# Patient Record
Sex: FEMALE | Race: WHITE
[De-identification: names, ages, dates, MRNs, and addresses within clinical notes are randomized per-mention and may not be internally consistent; named-entity substitution may affect disease eponyms.]

---

## 2017-01-04 ENCOUNTER — HOSPITAL ENCOUNTER (OUTPATIENT)
Dept: HOSPITAL 45 - C.LABSPEC | Age: 57
Discharge: HOME | End: 2017-01-04
Attending: DERMATOLOGY
Payer: COMMERCIAL

## 2017-01-04 DIAGNOSIS — R21: Primary | ICD-10-CM

## 2017-01-17 ENCOUNTER — HOSPITAL ENCOUNTER (OUTPATIENT)
Dept: HOSPITAL 45 - C.MAMM | Age: 57
Discharge: HOME | End: 2017-01-17
Payer: COMMERCIAL

## 2017-01-17 DIAGNOSIS — Z12.31: Primary | ICD-10-CM

## 2017-01-18 NOTE — MAMMOGRAPHY REPORT
BILATERAL DIGITAL SCREENING MAMMOGRAM TOMOSYNTHESIS WITH CAD: 1/17/2017

CLINICAL HISTORY: Routine screening.  Patient has no complaints.  





TECHNIQUE:  Breast tomosynthesis in addition to standard 2D mammography was performed. Current study
 was also evaluated with a Computer Aided Detection (CAD) system.  



COMPARISON: Comparison is made to exams dated:  8/12/2015 mammogram, 3/7/2014 mammogram, 1/31/2013 m
ammogram, 1/10/2011 mammogram, 12/30/2009 mammogram - [facility], and 12/1/2008.  
 



BREAST COMPOSITION:  The tissue of both breasts is almost entirely fatty.  



FINDINGS:  No suspicious mass, architectural distortion or cluster of microcalcifications is seen.  



IMPRESSION:  ACR BI-RADS CATEGORY 1: NEGATIVE

There is no mammographic evidence of malignancy. A 1 year screening mammogram is recommended.  The p
atient will receive written notification of the results.  





Approximately 10% of breast cancers are not detected with mammography. A negative mammographic repor
t should not delay biopsy if a clinically suggestive mass is present.



Mckayla collins/alie:1/17/2017 16:49:22  



Imaging Technologist: Liliana BECKFORD(R)(M)(BD), [facility]

letter sent: Normal 1/2  

BI-RADS Code: ACR BI-RADS Category 1: Negative

## 2017-03-22 ENCOUNTER — HOSPITAL ENCOUNTER (EMERGENCY)
Dept: HOSPITAL 45 - C.EDB | Age: 57
Discharge: HOME | End: 2017-03-22
Payer: COMMERCIAL

## 2017-03-22 VITALS — SYSTOLIC BLOOD PRESSURE: 136 MMHG | DIASTOLIC BLOOD PRESSURE: 85 MMHG | OXYGEN SATURATION: 97 % | HEART RATE: 76 BPM

## 2017-03-22 VITALS
WEIGHT: 236.34 LBS | BODY MASS INDEX: 35.41 KG/M2 | WEIGHT: 236.34 LBS | HEIGHT: 68.5 IN | HEIGHT: 68.5 IN | BODY MASS INDEX: 35.41 KG/M2

## 2017-03-22 VITALS — TEMPERATURE: 97.52 F

## 2017-03-22 VITALS — OXYGEN SATURATION: 95 %

## 2017-03-22 DIAGNOSIS — Z83.3: ICD-10-CM

## 2017-03-22 DIAGNOSIS — J45.909: ICD-10-CM

## 2017-03-22 DIAGNOSIS — Z80.9: ICD-10-CM

## 2017-03-22 DIAGNOSIS — G44.209: ICD-10-CM

## 2017-03-22 DIAGNOSIS — Z82.49: ICD-10-CM

## 2017-03-22 DIAGNOSIS — Z79.899: ICD-10-CM

## 2017-03-22 DIAGNOSIS — K29.70: ICD-10-CM

## 2017-03-22 DIAGNOSIS — I10: Primary | ICD-10-CM

## 2017-03-22 LAB
ALP SERPL-CCNC: 67 U/L (ref 45–117)
ALT SERPL-CCNC: 41 U/L (ref 12–78)
ANION GAP SERPL CALC-SCNC: 8 MMOL/L (ref 3–11)
AST SERPL-CCNC: 24 U/L (ref 15–37)
BASOPHILS # BLD: 0.03 K/UL (ref 0–0.2)
BASOPHILS NFR BLD: 0.4 %
BUN SERPL-MCNC: 13 MG/DL (ref 7–18)
BUN/CREAT SERPL: 12.6 (ref 10–20)
CALCIUM SERPL-MCNC: 8.5 MG/DL (ref 8.5–10.1)
CHLORIDE SERPL-SCNC: 105 MMOL/L (ref 98–107)
CKMB/CK RATIO: 1.9 (ref 0–3)
CO2 SERPL-SCNC: 27 MMOL/L (ref 21–32)
COMPLETE: YES
CREAT CL PREDICTED SERPL C-G-VRATE: 82.2 ML/MIN
CREAT SERPL-MCNC: 1 MG/DL (ref 0.6–1.2)
EOSINOPHIL NFR BLD AUTO: 206 K/UL (ref 130–400)
GLUCOSE SERPL-MCNC: 113 MG/DL (ref 70–99)
HCT VFR BLD CALC: 41.2 % (ref 37–47)
IG%: 0.3 %
IMM GRANULOCYTES NFR BLD AUTO: 13.8 %
LYMPHOCYTES # BLD: 0.94 K/UL (ref 1.2–3.4)
MAGNESIUM SERPL-MCNC: 2.1 MG/DL (ref 1.8–2.4)
MCH RBC QN AUTO: 30.9 PG (ref 25–34)
MCHC RBC AUTO-ENTMCNC: 34 G/DL (ref 32–36)
MCV RBC AUTO: 90.9 FL (ref 80–100)
MONOCYTES NFR BLD: 7.2 %
NEUTROPHILS # BLD AUTO: 4.1 %
NEUTROPHILS NFR BLD AUTO: 74.2 %
PMV BLD AUTO: 11.1 FL (ref 7.4–10.4)
POTASSIUM SERPL-SCNC: 3.4 MMOL/L (ref 3.5–5.1)
RBC # BLD AUTO: 4.53 M/UL (ref 4.2–5.4)
SODIUM SERPL-SCNC: 140 MMOL/L (ref 136–145)
WBC # BLD AUTO: 6.83 K/UL (ref 4.8–10.8)

## 2017-03-22 NOTE — DIAGNOSTIC IMAGING REPORT
HEAD CT NONCONTRAST



CT DOSE: 687.98 mGy.cm



HISTORY:      Headache. Hypertension.



TECHNIQUE: Multiaxial CT images of the head were performed without the use of

intravenous contrast. Automated exposure control was utilized for this study.



Comparison: None.



Findings: There is a small retention cyst within the left sphenoid sinus. No

fluid levels within the paranasal sinuses. The The calvarium and skull base are

intact. The ventricles and sulci are within normal limits. There is no mass,

hematoma, midline shift, or acute infarct. Incidental note is made of a cavum

septa pellucida.



Impression:

No acute intracranial abnormality. 







Electronically signed by:  Jagdish Wagner M.D.

3/22/2017 1:06 PM



Dictated Date/Time:  3/22/2017 12:56 PM

## 2017-03-22 NOTE — EMERGENCY ROOM VISIT NOTE
History


Report prepared by Librado:  Kevin Gaspar


Under the Supervision of:  Dr. Christin Salazar M.D.


First contact with patient:  11:45


Chief Complaint:  HYPERTENSION


Stated Complaint:  HA, HIGH BLOOD PRESSURE-TESTED AT SCHOOL





History of Present Illness


The patient is a 57 year old female who presents to the Emergency Room with 

complaints of persistent headache that she woke up with this morning. She also 

complains of pressure in her face. The patient also notes that her blood 

pressure was high and the school nurse recommended she present to the ED for 

further evaluation. The patient was recently admitted and discharged from [facility] because her hands had turned blue and were progressively worsening. They 

did a CT scan of her chest there which shower a normal thoracic aorta, no PE, 

and was essentially a normal scan. She denies pain in her hands and feet at 

this time. The patient is currently being treated for a persistent 6-month rash 

which is being treated with dapsone and gabapentin. The patient also notes that 

her stool has been yellow-colored which she thinks might be a side effect of 

the medication. She denies chest pain, back pain or shortness of breath at this 

time.





   Source of History:  patient


   Onset:  this morning


   Position:  head


   Timing:  other (persistent)


   Associated Symptoms:  + rash, No SOB, No back pain, No chest pain


Note:


Other associated symptoms: high blood pressure, hands turning blue





Review of Systems


See HPI for pertinent positives & negatives. A total of 10 systems reviewed and 

were otherwise negative.





Past Medical & Surgical


Medical Problems:


(1) Asthma


(2) Bronchitis


(3) Gastritis








Family History





Diabetes mellitus


FH: cancer


FH: heart disease





Social History


Smoking Status:  Never Smoker


Alcohol Use:  none


Housing Status:  lives with family


Occupation Status:  employed





Current/Historical Medications


Scheduled


Diphenhydramine Hcl (Benadryl), 25 MG PO Q4H


Gabapentin (Neurontin), 300 MG PO HS


Hydrochlorothiazide (Hctz), 1 CAP PO DAILY


Triamcinolone Acet (Triamcinolone Acetonide), 1 APPLN TOP BID





Allergies


Coded Allergies:  


     Aspirin (Unverified  Allergy, Severe, sob, 3/22/17)


     NSAIDs (Unverified  Allergy, Severe, sob, 3/22/17)


     Naproxen (Unverified  Allergy, Severe, sob, 3/22/17)





Physical Exam


Vital Signs











  Date Time  Temp Pulse Resp B/P Pulse Ox O2 Delivery O2 Flow Rate FiO2


 


3/22/17 15:03  76 18 136/85 97   


 


3/22/17 14:04  76 18 116/73 96 Room Air  


 


3/22/17 13:32  77 18 130/70 98 Room Air  


 


3/22/17 13:28  76      


 


3/22/17 12:54  78 18 126/77 96 Room Air  


 


3/22/17 12:34  78 18 105/70 93 Room Air  


 


3/22/17 12:31  69 18 116/78 97 Room Air  


 


3/22/17 11:32  80 18 164/112 99 Room Air  


 


3/22/17 11:31  87      


 


3/22/17 11:28     95 Room Air  


 


3/22/17 11:14 36.4 76 18 170/116 96 Room Air  











Physical Exam


Vital signs reviewed.





General: Well-appearing female, in no significant distress. 





HEENT: No scleral icterus, PERRLA, neck supple.  Atraumatic.





Cardiovascular: Noted to be hypertensive, no extra sounds.





Pulmonary: Clear to auscultation bilaterally, normal work of breathing.





Abdomen: Soft, nontender, nondistended, positive bowel sounds.





Musculoskeletal: Atraumatic, no peripheral edema.





Neurologic: Patient awake alert and oriented x 3, full strength in all 4 

extremities.  Cranial nerves 2 through 12 grossly intact.





Skin: Warm, dry, no rash, mildly cyanotic bilateral digits, cool to touch with 

normal capillary refill.





Medical Decision & Procedures


ER Provider


Diagnostic Interpretation:


X-ray results as stated below per my interpretation and radiologist 

interpretation. Other radiology results as stated below per my review and 

radiologist interpretation:





HEAD CT NONCONTRAST





CT DOSE: 687.98 mGy.cm





HISTORY:      Headache. Hypertension.





TECHNIQUE: Multiaxial CT images of the head were performed without the use of


intravenous contrast. Automated exposure control was utilized for this study.





Comparison: None.





Findings: There is a small retention cyst within the left sphenoid sinus. No


fluid levels within the paranasal sinuses. The The calvarium and skull base are


intact. The ventricles and sulci are within normal limits. There is no mass,


hematoma, midline shift, or acute infarct. Incidental note is made of a cavum


septa pellucida.





Impression:


No acute intracranial abnormality. 











Electronically signed by:  Jagdish Wagner M.D.


3/22/2017 1:06 PM





Dictated Date/Time:  3/22/2017 12:56 PM








CHEST ONE VIEW PORTABLE





CLINICAL HISTORY: HTN dyspnea





COMPARISON STUDY:  No previous studies for comparison.





FINDINGS: Subtle interstitial infiltrate left lung base. Lungs otherwise appear


clear. No evidence for cardiac enlargement. Diaphragms smooth. 





IMPRESSION:  Minimal interstitial infiltrate left lung base 














Electronically signed by:  Hal Mccall M.D.


3/22/2017 12:20 PM





Dictated Date/Time:  3/22/2017 12:19 PM





Laboratory Results


3/22/17 12:40








Red Blood Count 4.53, Mean Corpuscular Volume 90.9, Mean Corpuscular Hemoglobin 

30.9, Mean Corpuscular Hemoglobin Concent 34.0, Mean Platelet Volume 11.1, 

Neutrophils (%) (Auto) 74.2, Lymphocytes (%) (Auto) 13.8, Monocytes (%) (Auto) 

7.2, Eosinophils (%) (Auto) 4.1, Basophils (%) (Auto) 0.4, Neutrophils # (Auto) 

5.07, Lymphocytes # (Auto) 0.94, Monocytes # (Auto) 0.49, Eosinophils # (Auto) 

0.28, Basophils # (Auto) 0.03





3/22/17 11:25

















Test


  3/22/17


11:25 3/22/17


12:40 3/22/17


12:51


 


Anion Gap


  8.0 mmol/L


(3-11) 


  


 


 


Est Creatinine Clear Calc


Drug Dose 82.2 ml/min 


  


  


 


 


Estimated GFR (


American) 73.5 


  


  


 


 


Estimated GFR (Non-


American 63.4 


  


  


 


 


BUN/Creatinine Ratio 12.6 (10-20)   


 


Calcium Level


  8.5 mg/dl


(8.5-10.1) 


  


 


 


Magnesium Level


  2.1 mg/dl


(1.8-2.4) 


  


 


 


Total Bilirubin


  0.9 mg/dl


(0.2-1) 


  


 


 


Direct Bilirubin


  0.2 mg/dl


(0-0.2) 


  


 


 


Aspartate Amino Transf


(AST/SGOT) 24 U/L (15-37) 


  


  


 


 


Alanine Aminotransferase


(ALT/SGPT) 41 U/L (12-78) 


  


  


 


 


Alkaline Phosphatase


  67 U/L


() 


  


 


 


Total Creatine Kinase


  93 U/L


() 


  


 


 


Creatine Kinase MB


  1.8 ng/ml


(0.5-3.6) 


  


 


 


Creatine Kinase MB Ratio 1.9 (0-3.0)   


 


Total Protein


  7.4 gm/dl


(6.4-8.2) 


  


 


 


Albumin


  3.8 gm/dl


(3.4-5.0) 


  


 


 


White Blood Count


  


  6.83 K/uL


(4.8-10.8) 


 


 


Red Blood Count


  


  4.53 M/uL


(4.2-5.4) 


 


 


Hemoglobin


  


  14.0 g/dL


(12.0-16.0) 


 


 


Hematocrit  41.2 % (37-47)  


 


Mean Corpuscular Volume


  


  90.9 fL


() 


 


 


Mean Corpuscular Hemoglobin


  


  30.9 pg


(25-34) 


 


 


Mean Corpuscular Hemoglobin


Concent 


  34.0 g/dl


(32-36) 


 


 


Platelet Count


  


  206 K/uL


(130-400) 


 


 


Mean Platelet Volume


  


  11.1 fL


(7.4-10.4) 


 


 


Neutrophils (%) (Auto)  74.2 %  


 


Lymphocytes (%) (Auto)  13.8 %  


 


Monocytes (%) (Auto)  7.2 %  


 


Eosinophils (%) (Auto)  4.1 %  


 


Basophils (%) (Auto)  0.4 %  


 


Neutrophils # (Auto)


  


  5.07 K/uL


(1.4-6.5) 


 


 


Lymphocytes # (Auto)


  


  0.94 K/uL


(1.2-3.4) 


 


 


Monocytes # (Auto)


  


  0.49 K/uL


(0.11-0.59) 


 


 


Eosinophils # (Auto)


  


  0.28 K/uL


(0-0.5) 


 


 


Basophils # (Auto)


  


  0.03 K/uL


(0-0.2) 


 


 


RDW Standard Deviation


  


  45.5 fL


(36.4-46.3) 


 


 


RDW Coefficient of Variation


  


  13.6 %


(11.5-14.5) 


 


 


Immature Granulocyte % (Auto)  0.3 %  


 


Immature Granulocyte # (Auto)


  


  0.02 K/uL


(0.00-0.02) 


 


 


Bedside Troponin I


  


  


  0.000 ng/ml


(0-0.045)





Laboratory results per my review.





Medications Administered











 Medications


  (Trade)  Dose


 Ordered  Sig/Key


 Route  Start Time


 Stop Time Status Last Admin


Dose Admin


 


 Hydralazine HCl


  (HydrALAZINE INJ)  10 mg  NOW  STAT


 IV.  3/22/17 12:00


 3/22/17 12:06 DC 3/22/17 12:26


10 MG


 


 Fentanyl Citrate


  (Fentanyl Inj)  100 mcg  NOW  STAT


 IV  3/22/17 12:00


 3/22/17 12:06 DC 3/22/17 12:26


100 MCG











ECG


Indication:  other


Rate (beats per minute):  82


Rhythm:  normal sinus


Findings:  no acute ischemic change, no ectopy





ED Course


1156: Past medical records reviewed. The patient was evaluated in room A2. A 

complete history and physical examination was performed. 





1200: Ordered Fentanyl Inj 100 mcg IV, Hydralazine HCl 10 mg IV.





1401: Upon reevaluation, the patient resting comfortably. I discussed findings 

with her. She  verbalized agreement of the treatment plan. She was discharged 

home.





Medical Decision


Differential diagnoses:


Aortic dissection, PE, Raynaud's phenomenon, autoimmune abnormality, infectious 

etiologies, hypertensive urgency, anxiety, or cardiac arrhythmia. 





This pt was evaluated and appeared to be in no distress.  IV access was 

obtained and lab work was drawn.  Records were obtained from the OSH regarding 

the pt's admission this week.  CT scan of the chest was negative for PE and any 

aortic abnl.  Pt lab work is fairly unremarkable today.  After IV fentanyl and 

hydralazine, the pt seemed to appear improved.  The cyanotic appearance of the 

hands diminished.  I am thinking this is r/t HTN or a vasospasm.  Case 

management has made an appt with rheumatology in follow up and pt has been 

started on HCTZ 12.5 mg daily.  She was asked to see her PCP this week for BP 

recheck and med management.  Pt will return to the ED for worsening of symptoms 

or any medical concerns.





Impression





 Primary Impression:  


 Hypertension


 Additional Impression:  


 Headache





Scribe Attestation


The scribe's documentation has been prepared under my direction and personally 

reviewed by me in its entirety. I confirm that the note above accurately 

reflects all work, treatment, procedures, and medical decision making performed 

by me.





Departure Information


Dispostion


Home / Self-Care





Prescriptions





Hydrochlorothiazide (HCTZ) 12.5 Mg Cap


1 CAP PO DAILY for 30 Days, #30 CAP 5 Refills


   Prov: Christin Salazar M.D.         3/22/17





Referrals


Isauro Walter DO (PCP)





Forms


HOME CARE DOCUMENTATION FORM,                                                 

               IMPORTANT VISIT INFORMATION, WORK / SCHOOL INSTRUCTIONS





Patient Instructions


My [facility]





Additional Instructions





Diagnosis: Hypertension, headache





Hydrochlorothiazide 12.5 mg once daily.





Follow-up with your physician within one week for reevaluation and blood 

pressure management.





Follow-up with rheumatology as scheduled by case management.





Return to the ER for worsening of symptoms or any medical concerns.





Problem Qualifiers








 Primary Impression:  


 Hypertension


 Hypertension type:  essential hypertension  Qualified Codes:  I10 - Essential (

primary) hypertension


 Additional Impression:  


 Headache


 Headache type:  tension-type  Headache chronicity pattern:  acute headache  

Intractability:  not intractable  Qualified Codes:  G44.209 - Tension-type 

headache, unspecified, not intractable

## 2017-03-22 NOTE — DIAGNOSTIC IMAGING REPORT
CHEST ONE VIEW PORTABLE



CLINICAL HISTORY: HTN dyspnea



COMPARISON STUDY:  No previous studies for comparison.



FINDINGS: Subtle interstitial infiltrate left lung base. Lungs otherwise appear

clear. No evidence for cardiac enlargement. Diaphragms smooth. 



IMPRESSION:  Minimal interstitial infiltrate left lung base 









Electronically signed by:  Hal Mccall M.D.

3/22/2017 12:20 PM



Dictated Date/Time:  3/22/2017 12:19 PM

## 2017-08-26 ENCOUNTER — HOSPITAL ENCOUNTER (OUTPATIENT)
Dept: HOSPITAL 45 - C.LAB | Age: 57
Discharge: HOME | End: 2017-08-26
Attending: INTERNAL MEDICINE
Payer: COMMERCIAL

## 2017-08-26 DIAGNOSIS — R76.8: Primary | ICD-10-CM

## 2017-08-26 DIAGNOSIS — R79.89: ICD-10-CM

## 2017-08-29 LAB
ALBUMIN SERPL-MCNC: 4 G/DL (ref 3.8–4.8)
BETA-2-GLOBULIN: 0.3 G/DL (ref 0.2–0.5)
ELECTROPHORESIS INTERPRET: (no result)
GAMMA GLOB SERPL ELPH-MCNC: 1.1 G/DL (ref 0.8–1.7)
PROT SERPL-MCNC: 6.7 G/DL (ref 6.2–8.3)

## 2017-09-27 ENCOUNTER — HOSPITAL ENCOUNTER (OUTPATIENT)
Dept: HOSPITAL 45 - C.MAMM | Age: 57
Discharge: HOME | End: 2017-09-27
Payer: COMMERCIAL

## 2017-09-27 DIAGNOSIS — N63: Primary | ICD-10-CM

## 2017-09-27 NOTE — MAMMOGRAPHY REPORT
UNILATERAL LEFT DIGITAL DIAGNOSTIC MAMMOGRAM TOMOSYNTHESIS WITH CAD AND TARGETED LEFT ULTRASOUND: 9/2
7/2017

CLINICAL HISTORY: 57-year-old woman presents with a new small round lump in the lower inner quadrant 
of the left breast.  No skin changes or nipple discharge.  





TECHNIQUE: Left breast tomosynthesis in addition to standard 2D mammography was performed. Current st
udy was also evaluated with a Computer Aided Detection (CAD) system.  



COMPARISON: Comparison is made to exams dated:  1/17/2017 mammogram, 8/12/2015 mammogram, 3/7/2014 ma
mmogram, 1/31/2013 mammogram, 1/16/2012 mammogram, and 1/10/2011 mammogram - [facility]
nter.   



BREAST COMPOSITION:  The tissue of the left breast is almost entirely fatty.  



FINDINGS: A triangular palpable marker was placed on the skin overlying the lower inner quadrant of t
he left breast, denoting the palpable lump pointed out by the patient.  There is no evidence of a new
 suspicious mass, architectural distortion, asymmetry or cluster of microcalcifications in the left b
reast.



Targeted ultrasound was performed in the area of palpable lump pointed out by the patient, in the 6:3
0 to 7:00 left breast, 10 cm from the nipple.  On palpation, there is soft nodular mobile tissue, mos
t likely representing prominent fat lobules.  On ultrasound, there is sonographically normal tissue w
ithout evidence of a discrete solid or cystic mass.





IMPRESSION:  ACR BI-RADS CATEGORY 1: NEGATIVE, TARGETED ULTRASOUND ACR BI-RADS CATEGORY 1: NEGATIVE 

There is no suspicious mammographic or targeted sonographic abnormality in the lower inner quadrant o
f the left breast to explain the new palpable lump pointed out by the patient.  Overall, there is no 
mammographic or targeted sonographic evidence of malignancy.  Continued clinical follow-up is recomme
nded, as biopsy of a clinically suspicious mass should not be precluded by negative imaging.  Otherwi
se, recommend return to annual screening mammography schedule.







Approximately 10% of breast cancers are not detected with mammography. A negative mammographic report
 should not delay biopsy if a clinically suggestive mass is present.



Mckayla Clemons M.D.          

ay/:9/27/2017 14:54:33  



Imaging Technologist: Carolin Johnson, [facility]

letter sent: Normal 1/2  

BI-RADS Code: ACR BI-RADS Category 1: Negative  Ultrasound BI-RADS: ACR BI-RADS Category 1: Negative

## 2018-01-29 ENCOUNTER — HOSPITAL ENCOUNTER (OUTPATIENT)
Dept: HOSPITAL 45 - C.MAMM | Age: 58
Discharge: HOME | End: 2018-01-29
Payer: COMMERCIAL

## 2018-01-29 DIAGNOSIS — Z12.31: Primary | ICD-10-CM

## 2018-01-30 NOTE — MAMMOGRAPHY REPORT
BILATERAL DIGITAL SCREENING MAMMOGRAM TOMOSYNTHESIS WITH CAD: 1/29/2018

CLINICAL HISTORY: Routine screening.  Patient has no complaints.  





TECHNIQUE:  Breast tomosynthesis in addition to standard 2D mammography was performed. Current study 
was also evaluated with a Computer Aided Detection (CAD) system.  



COMPARISON: Comparison is made to exams dated:  9/27/2017 ultrasound, 9/27/2017 mammogram, 1/17/2017 
mammogram, 8/12/2015 mammogram, 3/7/2014 mammogram, and 1/31/2013 mammogram - [facility]
enter.   



BREAST COMPOSITION:  The tissue of both breasts is almost entirely fatty.  



FINDINGS:  No suspicious masses, calcifications, or areas of architectural distortion are noted in ei
ther breast. There has been no significant interval change compared to prior exams.  





IMPRESSION:  ACR BI-RADS CATEGORY 1: NEGATIVE

There is no mammographic evidence of malignancy. A 1 year screening mammogram is recommended.  The pa
tient will receive written notification of the results.  





Approximately 10% of breast cancers are not detected with mammography. A negative mammographic report
 should not delay biopsy if a clinically suggestive mass is present.



Magali Shepherd M.D.          

/:1/29/2018 16:00:53  



Imaging Technologist: Chanelle BECKFORD(DYLON)(M), [facility]

letter sent: Normal 1/2  

BI-RADS Code: ACR BI-RADS Category 1: Negative

## 2018-03-02 ENCOUNTER — HOSPITAL ENCOUNTER (EMERGENCY)
Dept: HOSPITAL 45 - C.EDB | Age: 58
Discharge: HOME | End: 2018-03-02
Payer: COMMERCIAL

## 2018-03-02 VITALS
HEIGHT: 68.5 IN | HEIGHT: 68.5 IN | WEIGHT: 226.41 LBS | BODY MASS INDEX: 33.92 KG/M2 | WEIGHT: 226.41 LBS | BODY MASS INDEX: 33.92 KG/M2

## 2018-03-02 VITALS — TEMPERATURE: 98.24 F

## 2018-03-02 VITALS — OXYGEN SATURATION: 97 %

## 2018-03-02 VITALS — SYSTOLIC BLOOD PRESSURE: 136 MMHG | DIASTOLIC BLOOD PRESSURE: 87 MMHG | OXYGEN SATURATION: 98 % | HEART RATE: 85 BPM

## 2018-03-02 DIAGNOSIS — Z83.3: ICD-10-CM

## 2018-03-02 DIAGNOSIS — Z79.899: ICD-10-CM

## 2018-03-02 DIAGNOSIS — Z88.6: ICD-10-CM

## 2018-03-02 DIAGNOSIS — Z80.9: ICD-10-CM

## 2018-03-02 DIAGNOSIS — J20.9: Primary | ICD-10-CM

## 2018-03-02 NOTE — DIAGNOSTIC IMAGING REPORT
CHEST ONE VIEW PORTABLE



HISTORY:  58 years-old Female CHEST PAIN acute atypical chest pain



COMPARISON: Chest radiograph 3/22/2017



TECHNIQUE: Portable AP view of the chest



FINDINGS: 

Cardiomediastinal and hilar silhouettes are within normal limits. There is no

pneumothorax, pleural effusion, focal airspace consolidation or overt pulmonary

edema. Bones of the chest appear grossly intact. Small bilateral rudimentary

cervical ribs redemonstrated.



IMPRESSION: 

1. No acute process. 

2. Small bilateral cervical ribs.







The above report was generated using voice recognition software. It may contain

grammatical, syntax or spelling errors.







Electronically signed by:  Royce Benson M.D.

3/2/2018 8:49 PM



Dictated Date/Time:  3/2/2018 8:47 PM

## 2018-03-02 NOTE — EMERGENCY ROOM VISIT NOTE
History


Report prepared by Librado:  Joseph Garcia


Under the Supervision of:  Dr. Damien Alcazar M.D.


First contact with patient:  20:05


Chief Complaint:  COUGH


Stated Complaint:  COUGH





History of Present Illness


The patient is a 58 year old white female with a past medical history of asthma

, ACL repair, and bronchitis who presents to the ED with a cc of a constant 

cough beginning four days ago. Pt has been on Augmentin for the past four days. 

Pt has tried her inhaler, Mucinex, and cough syrup, nothing makes her symptoms 

better. Positive shortness of breath after a coughing spell, coughing up yellow 

phlegm. Negative nausea, vomiting, coughing up blood, a history of smoking, 

pain in her legs, swelling in her legs, chest pain, a history of blood clots, 

fevers. Pt did have diarrhea, and she attributes it to her Augmentin.





   Source of History:  patient


   Onset:  4 days ago


   Position:  other (respiratory)


   Quality:  other (mildly productive with yellow phlegm)


   Timing:  constant


   Associated Symptoms:  + SOB (after coughing spell), + diarrhea (attributed 

to her Augmentin), No fevers, No chest pain, No nausea, No vomiting


Note:


Denies: coughing up blood, pain in legs, swelling in legs





Review of Systems


See HPI for pertinent positives and negatives.  A total of ten systems were 

reviewed and were otherwise negative.





Past Medical & Surgical


Medical Problems:


(1) Asthma


(2) Bronchitis


(3) Gastritis








Family History





Diabetes mellitus


FH: cancer


FH: heart disease


Heart disease


Lung disease





Social History


Smoking Status:  Never Smoker


Smokeless Tobacco Use:  No


Alcohol Use:  occasionally


Marital Status:  


Housing Status:  lives with family


Occupation Status:  employed





Current/Historical Medications


Scheduled


Azithromycin (Zithromax), 250 MG PO DAILY


Cetirizine (Zyrtec), 10 MG PO DAILY





Scheduled PRN


Hydrocodone W/ Homatropine (Hycodan 5/1.5MG 5 Ml), 5 ML PO Q6H PRN for Cough


Ibuprofen (Motrin), 600 MG PO Q6H PRN for Pain





Allergies


Coded Allergies:  


     Aspirin (Unverified  Allergy, Severe, sob, 3/2/18)


     NSAIDs (Unverified  Allergy, Severe, sob, 3/2/18)


     Naproxen (Unverified  Allergy, Severe, sob, 3/2/18)





Physical Exam


Vital Signs











  Date Time  Temp Pulse Resp B/P (MAP) Pulse Ox O2 Delivery O2 Flow Rate FiO2


 


3/2/18 22:08  85 18 136/87 98   


 


3/2/18 20:42     97 Room Air  


 


3/2/18 20:41  83      


 


3/2/18 19:57 36.8 85 18 164/93 97 Room Air  











Physical Exam


GENERAL: Awake, alert, well-appearing, NAD


HENT: Normocephalic, atraumatic.


EYES: Normal conjunctiva. Sclera non-icteric.


NECK: Supple. No nuchal rigidity. FROM.


RESPIRATORY: CTAB, no rhonchi, wheezing, crackles


CARDIAC: RRR, no MRG


ABDOMEN: Soft, NTND, BS+


MSK: No chest wall TTP, no LE edema


NEURO: GCS 15, CN 2-12 intact, moves all 4s on command


SKIN: No rash or jaundice noted.





Medical Decision & Procedures


ER Provider


Diagnostic Interpretation:


X-ray: Per my interpretation, radiologist review. 





CHEST ONE VIEW PORTABLE





HISTORY:  58 years-old Female CHEST PAIN acute atypical chest pain





COMPARISON: Chest radiograph 3/22/2017





TECHNIQUE: Portable AP view of the chest





FINDINGS: 


Cardiomediastinal and hilar silhouettes are within normal limits. There is no


pneumothorax, pleural effusion, focal airspace consolidation or overt pulmonary


edema. Bones of the chest appear grossly intact. Small bilateral rudimentary


cervical ribs redemonstrated.





IMPRESSION: 


1. No acute process. 


2. Small bilateral cervical ribs.





The above report was generated using voice recognition software. It may contain


grammatical, syntax or spelling errors.





Electronically signed by:  Royce Benson M.D.


3/2/2018 8:49 PM





Dictated Date/Time:  3/2/2018 8:47 PM





Medications Administered











 Medications


  (Trade)  Dose


 Ordered  Sig/Key


 Route  Start Time


 Stop Time Status Last Admin


Dose Admin


 


 Acetaminophen


  (Tylenol Tab)  1,000 mg  NOW  STAT


 PO  3/2/18 20:30


 3/2/18 20:32 DC 3/2/18 20:39


1,000 MG


 


 Menthol


  (Nice Alex)  1 alex  NOW  STAT


 ALEX  3/2/18 20:30


 3/2/18 20:32 DC 3/2/18 20:40


1 ALEX


 


 Benzonatate


  (Tessalon Perles


 Cap)  100 mg  NOW  ONCE


 PO  3/2/18 20:30


 3/2/18 20:32 DC 3/2/18 20:40


100 MG


 


 Albuterol/


 Ipratropium


  (Duoneb)  3 ml  ONE  STAT


 INH  3/2/18 20:30


 3/2/18 20:32 DC 3/2/18 20:40


3 ML


 


 Azithromycin


  (Zithromax Tab)  500 mg  NOW  ONCE


 PO  3/2/18 21:00


 3/2/18 21:01 DC 3/2/18 20:55


500 MG











ECG Per My Interpretation


Indication:  SOB/dyspnea


Rate (beats per minute):  77


Rhythm:  normal sinus


Findings:  no ectopy, other (Normal axis. Normal intervals. No STS or TWI.)





ED Course


2020: The patient was evaluated in room B07. A complete history and physical 

exam was performed.





2121: I reevaluated the patient. Discussed results and discharge instructions: 

she verbalized understanding and agreement. The patient is ready for discharge.





Medical Decision


The patient is a 58 year old white female with a past medical history of asthma

, ACL repair, and bronchitis who presents to the ED with a cc of a constant 

cough beginning four days ago.





Differential diagnosis:


Etiologies such as infections, reactive airway disease, pneumonia, pneumothorax

, COPD, CHF, cardiac ischemia, pulmonary embolism, musculoskeletal, 

gastrointestinal, as well as others were entertained.





Patient was seen and evaluated the bedside.  Patient had been complaining of 

some cough.  Patient states that she had had some productive sputum and is 

recently started on Augmentin.  Patient has tried taking over-the-counter 

medications for cough but this is not proved.  The patient has not been getting 

much sleep.  Patient denies any exertional symptoms.  Patient denies any lower 

extremity swelling or prior history of DVT or PE.  On exam the patient is very 

well-appearing.  Patient is afebrile with stable vital signs.  Patient did have 

a chest x-ray which is negative acute.  Patient's EKG is nonischemic.  I 

believe that this most likely infectious in nature.  ACS and PE are less 

likely.  Patient was feeling mildly improved after her treatment.  Patient was 

told to continue over-the-counter medications.  She was started on azithromycin 

for possible atypical coverage.  Patient was told to continue her Augmentin.  

Patient was deemed suitable for outpatient follow-up and treatment at this 

time.  Patient was given strict follow-up, discharge, and return precautions.  

All questions were answered.  Patient was deemed suitable for outpatient follow-

up at this time.  Patient agreed with the plan of care and was safely 

discharged home.





Medication Reconcilliation


Current Medication List:  was personally reviewed by me





Blood Pressure Screening


Patient's blood pressure:  Elevated blood pressure


Blood pressure disposition:  Referred to PCP





Impression





 Primary Impression:  


 Cough


 Additional Impression:  


 Acute bronchitis





Scribe Attestation


The scribe's documentation has been prepared under my direction and personally 

reviewed by me in its entirety. I confirm that the note above accurately 

reflects all work, treatment, procedures, and medical decision making performed 

by me.





Departure Information


Dispostion


Home / Self-Care





Prescriptions





Hydrocodone W/ Homatropine (HYCODAN 5/1.5MG 5 ML) 1 Syp Syp


5 ML PO Q6H Y for Cough, #60 ML


   Prov: Damien Alcazar M.D.         3/2/18 


Azithromycin (Zithromax) 250 Mg Tab


250 MG PO DAILY, #4 TAB


   Prov: Damien Alcazar M.D.         3/2/18





Referrals


Isauro Walter DO (PCP)





Forms


HOME CARE DOCUMENTATION FORM,                                                 

               IMPORTANT VISIT INFORMATION





Patient Instructions


Bronchitis Acute, Coughing Techniques, [facility]





Additional Instructions





Please return to the emergency department if you have worsening or recurrent 

symptoms not amenable to at-home treatment.  Please call for a follow-up 

appointment with her primary care physician.  Please take your medications as 

prescribed.  If you have other concerns and/or complaints please feel free to 

also call your primary care physician's office or return the ED for further 

evaluation, management, and treatment.





You were found to have an elevated blood pressure today (>120 sytolic or >90 

diastolic). Per medicare guidelines, you need to follow up with this blood 

pressure screening with your Primary Care Physician (PCP). For a new PCP call 

[phone].





You received narcotic or benzodiazepene medication while in the emergency room 

today. This is an addictive medication that may cause drowziness as well as 

constipation. Do not drive, operate heavy machinery, or drink alcohol under the 

influence of this medication.





You may take 600 mg Ibuprofen every 6 hours as needed for pain with food for no 

more than 2 consecutive days. You may take tylenol 1000 mg every 6 hours as 

needed for pain. You may take motrin and tylenol separately or at the same 

time. 





You may also take Tessalon Perles or Cepacol as a cough suppressant.  He may 

use Mucinex as an expectorant.





Take your medications as prescribed. If taking an antibiotic consider taking a 

probiotic and/or eating yogurt, but at the least, please take with food as it 

can cause upset stomach.





You have been examined and treated today on an emergency basis only. This is 

not a substitute for, or an effort to provide, complete comprehensive medical 

care. It is impossible to recognize and treat all injuries or illnesses in a 

single emergency department visit. It is therefore important that you follow up 

closely with [facility], your PCP, and/or your specialist(s). 

Call as soon as possible for an appointment.





Thank you for your time and consideration. I look forward to speaking with you 

again soon. Please don't hesitate to call us if you have any questions.





Problem Qualifiers








 Additional Impression:  


 Acute bronchitis


 Bronchitis organism:  unspecified organism  Qualified Codes:  J20.9 - Acute 

bronchitis, unspecified